# Patient Record
Sex: FEMALE | URBAN - NONMETROPOLITAN AREA
[De-identification: names, ages, dates, MRNs, and addresses within clinical notes are randomized per-mention and may not be internally consistent; named-entity substitution may affect disease eponyms.]

---

## 2019-10-07 ENCOUNTER — IMPORTED ENCOUNTER (OUTPATIENT)
Dept: URBAN - NONMETROPOLITAN AREA CLINIC 1 | Facility: CLINIC | Age: 54
End: 2019-10-07

## 2019-10-07 PROBLEM — E11.9: Noted: 2019-10-07

## 2019-10-07 PROBLEM — H25.043: Noted: 2019-10-07

## 2019-10-07 PROCEDURE — S0621 ROUTINE OPHTHALMOLOGICAL EXA: HCPCS

## 2019-10-07 NOTE — PATIENT DISCUSSION
DM s -Stressed the importance of keeping blood sugars under control and regular visits with PCP. -Explained the possible effects of poorly controlled diabetes and the damage that diabetes can cause to ocular health. -Pt instructed to contact our office with any vision changes. Cataract OU +PSC MODERATE OU-Not yet surgical. -Reviewed symptoms of advancing cataract growth such as glare and halos and decreased vision.-Continue to monitor for now.  Pt will notify us if any new symptoms develop.; Dr's Notes: [id number]za PCP

## 2021-03-25 ENCOUNTER — IMPORTED ENCOUNTER (OUTPATIENT)
Dept: URBAN - NONMETROPOLITAN AREA CLINIC 1 | Facility: CLINIC | Age: 56
End: 2021-03-25

## 2021-03-25 PROCEDURE — S0621 ROUTINE OPHTHALMOLOGICAL EXA: HCPCS

## 2021-03-25 NOTE — PATIENT DISCUSSION
DM s -Stressed the importance of keeping blood sugars under control and regular visits with PCP. -Explained the possible effects of poorly controlled diabetes and the damage that diabetes can cause to ocular health. -Pt instructed to contact our office with any vision changes. Cataract OU +PSC MODERATE OU-Not yet surgical. -Reviewed symptoms of advancing cataract growth such as glare and halos and decreased vision.-Continue to monitor for now.  Pt will notify us if any new symptoms develop.; Dr's Notes: [phone] Jenkins

## 2022-04-09 ASSESSMENT — VISUAL ACUITY
OD_SC: 20/80
OD_CC: J1
OD_CC: J1
OS_SC: 20/70
OS_CC: J1
OD_SC: 20/70
OS_SC: 20/80
OS_CC: J1

## 2022-04-09 ASSESSMENT — TONOMETRY
OS_IOP_MMHG: 14
OS_IOP_MMHG: 16
OD_IOP_MMHG: 16
OD_IOP_MMHG: 14

## 2022-07-29 ENCOUNTER — ESTABLISHED PATIENT (OUTPATIENT)
Dept: RURAL CLINIC 1 | Facility: CLINIC | Age: 57
End: 2022-07-29

## 2022-07-29 DIAGNOSIS — E11.9: ICD-10-CM

## 2022-07-29 DIAGNOSIS — H25.043: ICD-10-CM

## 2022-07-29 PROCEDURE — S0621 ROUTINE OPHTHALMOLOGICAL EXA: HCPCS

## 2022-07-29 ASSESSMENT — TONOMETRY
OD_IOP_MMHG: 16
OS_IOP_MMHG: 16

## 2022-07-29 ASSESSMENT — VISUAL ACUITY
OU_CC: 20/30
OS_CC: 20/25
OD_CC: 20/25-2

## 2022-07-29 NOTE — PATIENT DISCUSSION
DM s -Stressed the importance of keeping blood sugars under control and regular visits with PCP. -Explained the possible effects of poorly controlled diabetes and the damage that diabetes can cause to ocular health. -Pt instructed to contact our office with any vision changes. Cataract OU +PSC MODERATE OU-Not yet surgical. -Reviewed symptoms of advancing cataract growth such as glare and halos and decreased vision.-Continue to monitor for now. Pt will notify us if any new symptoms develop.; Dr's Notes: [id number]rine Jenkins.

## 2023-11-13 ENCOUNTER — ESTABLISHED PATIENT (OUTPATIENT)
Dept: RURAL CLINIC 1 | Facility: CLINIC | Age: 58
End: 2023-11-13

## 2023-11-13 DIAGNOSIS — E11.9: ICD-10-CM

## 2023-11-13 DIAGNOSIS — H25.043: ICD-10-CM

## 2023-11-13 PROCEDURE — 99214 OFFICE O/P EST MOD 30 MIN: CPT

## 2023-11-13 ASSESSMENT — VISUAL ACUITY
OS_CC: 20/30
OS_CC: 20/20
OD_CC: 20/25-2
OD_CC: 20/20

## 2023-11-13 ASSESSMENT — TONOMETRY
OS_IOP_MMHG: 18
OD_IOP_MMHG: 18

## 2024-11-14 ENCOUNTER — COMPREHENSIVE EXAM (OUTPATIENT)
Dept: RURAL CLINIC 1 | Facility: CLINIC | Age: 59
End: 2024-11-14

## 2024-11-14 DIAGNOSIS — H25.043: ICD-10-CM

## 2024-11-14 DIAGNOSIS — E11.9: ICD-10-CM

## 2024-11-14 PROCEDURE — 92014 COMPRE OPH EXAM EST PT 1/>: CPT

## 2025-03-12 ENCOUNTER — EMERGENCY VISIT (OUTPATIENT)
Age: 60
End: 2025-03-12

## 2025-03-12 DIAGNOSIS — H40.013: ICD-10-CM

## 2025-03-12 DIAGNOSIS — E11.9: ICD-10-CM

## 2025-03-12 DIAGNOSIS — H25.13: ICD-10-CM

## 2025-03-12 DIAGNOSIS — H25.043: ICD-10-CM

## 2025-03-12 PROCEDURE — 99214 OFFICE O/P EST MOD 30 MIN: CPT

## 2025-03-26 ENCOUNTER — FOLLOW UP (OUTPATIENT)
Age: 60
End: 2025-03-26

## 2025-03-26 DIAGNOSIS — H25.13: ICD-10-CM

## 2025-03-26 DIAGNOSIS — H40.013: ICD-10-CM

## 2025-03-26 DIAGNOSIS — E11.9: ICD-10-CM

## 2025-03-26 DIAGNOSIS — H25.043: ICD-10-CM

## 2025-03-26 PROCEDURE — 92083 EXTENDED VISUAL FIELD XM: CPT

## 2025-03-26 PROCEDURE — 99214 OFFICE O/P EST MOD 30 MIN: CPT

## 2025-04-23 ENCOUNTER — CONSULTATION/EVALUATION (OUTPATIENT)
Age: 60
End: 2025-04-23

## 2025-04-23 DIAGNOSIS — E11.9: ICD-10-CM

## 2025-04-23 DIAGNOSIS — H25.813: ICD-10-CM

## 2025-04-23 DIAGNOSIS — H40.013: ICD-10-CM

## 2025-04-23 PROCEDURE — 92136 OPHTHALMIC BIOMETRY: CPT

## 2025-04-23 PROCEDURE — 92134 CPTRZ OPH DX IMG PST SGM RTA: CPT | Mod: NC

## 2025-04-23 PROCEDURE — 99214 OFFICE O/P EST MOD 30 MIN: CPT

## 2025-04-23 PROCEDURE — 92025 CPTRIZED CORNEAL TOPOGRAPHY: CPT | Mod: NC

## 2025-05-12 ENCOUNTER — PRE-OP/H&P (OUTPATIENT)
Age: 60
End: 2025-05-12

## 2025-05-12 VITALS
BODY MASS INDEX: 29.77 KG/M2 | WEIGHT: 168 LBS | HEIGHT: 63 IN | HEART RATE: 71 BPM | DIASTOLIC BLOOD PRESSURE: 68 MMHG | SYSTOLIC BLOOD PRESSURE: 124 MMHG

## 2025-05-12 DIAGNOSIS — E11.9: ICD-10-CM

## 2025-05-12 DIAGNOSIS — I10: ICD-10-CM

## 2025-05-12 DIAGNOSIS — E78.2: ICD-10-CM

## 2025-05-12 DIAGNOSIS — I50.9: ICD-10-CM

## 2025-05-12 DIAGNOSIS — K21.9: ICD-10-CM

## 2025-05-12 DIAGNOSIS — Z01.818: ICD-10-CM

## 2025-05-12 DIAGNOSIS — F32.9: ICD-10-CM

## 2025-05-12 PROCEDURE — 99213 OFFICE O/P EST LOW 20 MIN: CPT

## 2025-05-27 ENCOUNTER — SURGERY/PROCEDURE (OUTPATIENT)
Age: 60
End: 2025-05-27

## 2025-05-27 DIAGNOSIS — H25.812: ICD-10-CM

## 2025-05-27 PROCEDURE — 66984 XCAPSL CTRC RMVL W/O ECP: CPT

## 2025-05-28 ENCOUNTER — POST-OP (OUTPATIENT)
Age: 60
End: 2025-05-28

## 2025-05-28 DIAGNOSIS — Z96.1: ICD-10-CM

## 2025-05-28 PROCEDURE — 99024 POSTOP FOLLOW-UP VISIT: CPT

## 2025-06-03 ENCOUNTER — SURGERY/PROCEDURE (OUTPATIENT)
Age: 60
End: 2025-06-03

## 2025-06-03 DIAGNOSIS — H25.811: ICD-10-CM

## 2025-06-03 PROCEDURE — 66984 XCAPSL CTRC RMVL W/O ECP: CPT | Mod: 79,RT

## 2025-06-04 ENCOUNTER — POST-OP (OUTPATIENT)
Age: 60
End: 2025-06-04

## 2025-06-04 DIAGNOSIS — Z96.1: ICD-10-CM

## 2025-06-04 PROCEDURE — 99024 POSTOP FOLLOW-UP VISIT: CPT

## 2025-06-11 ENCOUNTER — POST-OP (OUTPATIENT)
Age: 60
End: 2025-06-11

## 2025-06-11 DIAGNOSIS — Z96.1: ICD-10-CM

## 2025-06-11 PROCEDURE — 99024 POSTOP FOLLOW-UP VISIT: CPT
